# Patient Record
Sex: MALE
[De-identification: names, ages, dates, MRNs, and addresses within clinical notes are randomized per-mention and may not be internally consistent; named-entity substitution may affect disease eponyms.]

---

## 2019-01-09 ENCOUNTER — HOSPITAL ENCOUNTER (OUTPATIENT)
Dept: HOSPITAL 88 - RAD | Age: 64
End: 2019-01-09
Attending: INTERNAL MEDICINE
Payer: COMMERCIAL

## 2019-01-09 DIAGNOSIS — I10: Primary | ICD-10-CM

## 2019-01-09 PROCEDURE — 71046 X-RAY EXAM CHEST 2 VIEWS: CPT

## 2019-04-20 ENCOUNTER — HOSPITAL ENCOUNTER (OUTPATIENT)
Dept: HOSPITAL 88 - OR | Age: 64
Discharge: HOME | End: 2019-04-20
Attending: INTERNAL MEDICINE
Payer: COMMERCIAL

## 2019-04-20 VITALS — DIASTOLIC BLOOD PRESSURE: 82 MMHG | SYSTOLIC BLOOD PRESSURE: 128 MMHG

## 2019-04-20 DIAGNOSIS — E78.5: ICD-10-CM

## 2019-04-20 DIAGNOSIS — D12.3: ICD-10-CM

## 2019-04-20 DIAGNOSIS — Z01.810: ICD-10-CM

## 2019-04-20 DIAGNOSIS — K29.70: Primary | ICD-10-CM

## 2019-04-20 DIAGNOSIS — K64.8: ICD-10-CM

## 2019-04-20 DIAGNOSIS — Z88.8: ICD-10-CM

## 2019-04-20 DIAGNOSIS — K62.1: ICD-10-CM

## 2019-04-20 DIAGNOSIS — K31.89: ICD-10-CM

## 2019-04-20 DIAGNOSIS — K20.9: ICD-10-CM

## 2019-04-20 DIAGNOSIS — I10: ICD-10-CM

## 2019-04-20 DIAGNOSIS — K21.9: ICD-10-CM

## 2019-04-20 PROCEDURE — 93005 ELECTROCARDIOGRAM TRACING: CPT

## 2019-04-20 PROCEDURE — 45385 COLONOSCOPY W/LESION REMOVAL: CPT

## 2019-04-20 PROCEDURE — 45384 COLONOSCOPY W/LESION REMOVAL: CPT

## 2019-04-20 PROCEDURE — 43239 EGD BIOPSY SINGLE/MULTIPLE: CPT

## 2019-04-20 PROCEDURE — 45378 DIAGNOSTIC COLONOSCOPY: CPT

## 2019-04-20 NOTE — OPERATIVE REPORT
DATE OF PROCEDURE:  04/20/2019

 

SURGEON:  Dakota Zamudio MD

 

PROCEDURE:  Esophagogastroduodenoscopy with biopsies and colonoscopy with polypectomy.

 

INDICATIONS FOR EGD:  Heartburn/bloating.

 

INDICATIONS FOR COLONOSCOPY:  Surveillance colonoscopy, personal history of colon polyps.

 

MEDICATIONS:  The patient was done under MAC, please see anesthesiologist's note.

 

PROCEDURE IN DETAIL:  With the patient in left lateral decubitus position, flexible

fiberoptic Olympus gastroscope was introduced into the esophagus under direct

visualization without any difficulty.  There was some patchy erythema noted in distal

esophagus.  A minute tongue of velvety red mucosa was noted to extend proximally from

the GE junction that was biopsied to rule out Raamn's.  The scope was then advanced

with ease into the stomach and mucosa overlying the antrum and the body revealed some

patchy erythema and mild-to-moderate edema and biopsies were obtained and sent to stain

for H pylori.  Pylorus was intubated with ease and the scope was advanced all the way to

the second portion of the duodenum.  Biopsies were obtained from the proximal and second

portion and the duodenal bulb to rule out sprue.  A minute nodule was noted in the

duodenal bulb that was biopsied.  The scope was then withdrawn back into the stomach and

retroflexed and a submucosal nodule was noted in the fundus that was biopsied, the

cardia appeared to be within normal limits.  The scope was then straightened out.  The

stomach was decompressed.  The scope was subsequently withdrawn.  The patient tolerated

procedure well. 

 

IMPRESSION:  

1. Distal esophagitis.

2. Rule out Raman's esophagus.

3. Gastritis, biopsied, biopsies sent to stain for H pylori.

4. Submucosal nodule in fundus, biopsied.

5. Duodenal bulb nodule, biopsied.

6. Rule out sprue.

 

PLAN:  Follow up histology.  Initiate Protonix 40 mg one p.o. q.a.m. a.c.

 

PROCEDURE IN DETAIL:  The patient was then turned around.  After adequate lubrication of

the anal canal, flexible fiberoptic Olympus colonoscope was inserted into the rectum

with ease and advanced all the way to the cecum.  The scope was then withdrawn slowly

and mucosa overlying the cecum appeared to be within normal limits.  One polyp was hot

biopsied from the ascending colon and one polyp was snared from the transverse colon.

The descending and sigmoid grossly appeared to be within normal limits.  Three polyps

were hot biopsied from the rectum.  The scope was then retroflexed into the distal

rectum and small internal hemorrhoids were noted, none of which was actively bleeding.

The scope was then straightened out and it was subsequently withdrawn.  The patient

tolerated procedure well. 

 

IMPRESSION:  

1. Ascending colon polyp, hot biopsied.

2. Transverse colon polyp, snared.

3. Rectal polyps x3, hot biopsied.

4. Internal hemorrhoids, none actively bleeding.

 

PLAN:  Follow up histology.  Initiate high-fiber, low-fat diet.  Initiate high-fiber

supplement.  The patient might benefit from a followup colonoscopy in 3 years. 

 

 

 

 

______________________________

Dakota Zamudio MD

 

[facility] [place]

D:  04/20/2019 13:59:19

T:  04/20/2019 15:49:30

Job #:  [id number]

 

cc:            Cecilio Hernández MD

## 2019-04-22 NOTE — XMS REPORT
Patient Summary Document

                             Created on: 2019



YESSI LIPSCOMB

External Reference #: [id number]

: 1955

Sex: Male



Demographics







                          Address                   [address] [place], [place] [id number]

 

                          Home Phone                [phone]

 

                          Preferred Language        Unknown

 

                          Marital Status            Unknown

 

                          Confucianism Affiliation     Unknown

 

                          Race                      Unknown

 

                                        Additional Race(s)  

 

                          Ethnic Group              Unknown





Author







                          [facility]                    [facility]

 

                          [facility]

 

                          Address                   Unknown

 

                          Phone                     Unavailable







Care Team Providers







                    Care Team Member Name    Role                Phone

 

                    BOLIVAR SERNA     Unavailable         Unavailable







Problems

This patient has no known problems.



Allergies, Adverse Reactions, Alerts

This patient has no known allergies or adverse reactions.



Medications

This patient has no known medications.



Results







           Test Description    Test Time    Test Comments    Text Results    Atomic Results    Result

 Comments

 

                CHEST 2 VIEWS    2019 17:59:00                                                             

                                             [place] [address]  
   Patient Name: YESSI LIPSCOMB                                   MR #: [id number]  
                  : 1955                                   Age/Sex: 
63/M  Acct #: [id number]                              Req #: [id number]  Adm 
Physician:                                                      Ordered by: 
BOLIVAR SERNA MD                            Report #: [id number]        
Location: [facility]                                     Room/Bed:                     
___________________________________________________________________________________________________
   Procedure: [id number] DX/CHEST 2 VIEWS  Exam Date: 19                   
        Exam Time: 1725                                              REPORT 
STATUS: Signed    EXAMINATION:  CHEST 2 VIEWS          INDICATION: ESSENTIAL 
HYPERTENSION       COMPARISON:  None           FINDINGS:   TUBES and LINES:  
None.      LUNGS:  Mild patchy density in the right lung base consistent 
subsegmental   atelectasis.  There is no evidence of pneumonia or pulmonary 
edema.      PLEURA:  No pleural effusion or pneumothorax.      HEART AND 
MEDIASTINUM:  The cardiomediastinal silhouette is unremarkable. There   are 
atherosclerotic calcifications within the aorta.      BONES AND SOFT TISSUES:  
No acute osseous lesion.  Soft tissues are   unremarkable.      UPPER ABDOMEN: 
No free air under the diaphragm.          IMPRESSION:    No acute thoracic abno
rmality.         Signed by: Dr. CHRIS Venegas M.D. on 2019 6:02 PM  
     Dictated By: NEHAL VENEGAS MD, MD  Electronically Signed By: NEHAL VENEGAS MD, MD on 19  Transcribed By: GRAY on 19       COPY TO: 
 BOLIVAR SERNA MD

## 2021-09-08 LAB
ANION GAP SERPL CALC-SCNC: 14.9 MMOL/L (ref 8–16)
BUN SERPL-MCNC: 10 MG/DL (ref 7–26)
BUN/CREAT SERPL: 10 (ref 6–25)
CALCIUM SERPL-MCNC: 9.5 MG/DL (ref 8.4–10.2)
CHLORIDE SERPL-SCNC: 103 MMOL/L (ref 98–107)
CO2 SERPL-SCNC: 26 MMOL/L (ref 22–29)
EGFRCR SERPLBLD CKD-EPI 2021: 71 ML/MIN (ref 60–?)
GLUCOSE SERPLBLD-MCNC: 111 MG/DL (ref 74–118)
POTASSIUM SERPL-SCNC: 3.9 MMOL/L (ref 3.5–5.1)
SODIUM SERPL-SCNC: 140 MMOL/L (ref 136–145)

## 2021-09-11 ENCOUNTER — HOSPITAL ENCOUNTER (OUTPATIENT)
Dept: HOSPITAL 88 - OR | Age: 66
Discharge: HOME | End: 2021-09-11
Attending: INTERNAL MEDICINE
Payer: COMMERCIAL

## 2021-09-11 VITALS — SYSTOLIC BLOOD PRESSURE: 123 MMHG | DIASTOLIC BLOOD PRESSURE: 69 MMHG

## 2021-09-11 DIAGNOSIS — R19.5: ICD-10-CM

## 2021-09-11 DIAGNOSIS — E11.9: ICD-10-CM

## 2021-09-11 DIAGNOSIS — Z86.010: ICD-10-CM

## 2021-09-11 DIAGNOSIS — Z01.812: ICD-10-CM

## 2021-09-11 DIAGNOSIS — I10: ICD-10-CM

## 2021-09-11 DIAGNOSIS — Z88.8: ICD-10-CM

## 2021-09-11 DIAGNOSIS — Z01.810: ICD-10-CM

## 2021-09-11 DIAGNOSIS — E78.00: ICD-10-CM

## 2021-09-11 DIAGNOSIS — K20.90: ICD-10-CM

## 2021-09-11 DIAGNOSIS — Z79.82: ICD-10-CM

## 2021-09-11 DIAGNOSIS — K29.70: Primary | ICD-10-CM

## 2021-09-11 DIAGNOSIS — Z79.84: ICD-10-CM

## 2021-09-11 DIAGNOSIS — K25.9: ICD-10-CM

## 2021-09-11 DIAGNOSIS — Z20.822: ICD-10-CM

## 2021-09-11 DIAGNOSIS — K59.09: ICD-10-CM

## 2021-09-11 PROCEDURE — 93005 ELECTROCARDIOGRAM TRACING: CPT

## 2021-09-11 PROCEDURE — 80048 BASIC METABOLIC PNL TOTAL CA: CPT

## 2021-09-11 PROCEDURE — 36415 COLL VENOUS BLD VENIPUNCTURE: CPT

## 2021-09-11 PROCEDURE — 82948 REAGENT STRIP/BLOOD GLUCOSE: CPT

## 2021-09-11 PROCEDURE — 43239 EGD BIOPSY SINGLE/MULTIPLE: CPT

## 2021-12-14 ENCOUNTER — HOSPITAL ENCOUNTER (OUTPATIENT)
Dept: HOSPITAL 88 - RAD | Age: 66
End: 2021-12-14
Attending: INTERNAL MEDICINE
Payer: COMMERCIAL

## 2021-12-14 DIAGNOSIS — G47.33: Primary | ICD-10-CM

## 2021-12-14 PROCEDURE — 71046 X-RAY EXAM CHEST 2 VIEWS: CPT
